# Patient Record
Sex: MALE | Race: WHITE | NOT HISPANIC OR LATINO | Employment: OTHER | URBAN - NONMETROPOLITAN AREA
[De-identification: names, ages, dates, MRNs, and addresses within clinical notes are randomized per-mention and may not be internally consistent; named-entity substitution may affect disease eponyms.]

---

## 2017-01-18 ENCOUNTER — TRANSCRIBE ORDERS (OUTPATIENT)
Dept: PODIATRY | Facility: CLINIC | Age: 77
End: 2017-01-18

## 2017-01-18 DIAGNOSIS — B35.1 ONYCHOMYCOSIS: Primary | ICD-10-CM

## 2017-01-18 DIAGNOSIS — L84 CALLUS: ICD-10-CM

## 2017-03-07 ENCOUNTER — APPOINTMENT (OUTPATIENT)
Dept: LAB | Facility: HOSPITAL | Age: 77
End: 2017-03-07

## 2017-03-07 ENCOUNTER — OFFICE VISIT (OUTPATIENT)
Dept: CARDIAC SURGERY | Facility: CLINIC | Age: 77
End: 2017-03-07

## 2017-03-07 ENCOUNTER — TRANSCRIBE ORDERS (OUTPATIENT)
Dept: LAB | Facility: HOSPITAL | Age: 77
End: 2017-03-07

## 2017-03-07 VITALS
BODY MASS INDEX: 30.92 KG/M2 | SYSTOLIC BLOOD PRESSURE: 176 MMHG | HEART RATE: 53 BPM | OXYGEN SATURATION: 91 % | DIASTOLIC BLOOD PRESSURE: 72 MMHG | TEMPERATURE: 97.7 F | HEIGHT: 68 IN | WEIGHT: 204 LBS

## 2017-03-07 DIAGNOSIS — R09.89 BRUIT: ICD-10-CM

## 2017-03-07 DIAGNOSIS — D50.8 IRON DEFICIENCY ANEMIA SECONDARY TO INADEQUATE DIETARY IRON INTAKE: ICD-10-CM

## 2017-03-07 DIAGNOSIS — I73.9 PVD (PERIPHERAL VASCULAR DISEASE) (HCC): Primary | ICD-10-CM

## 2017-03-07 DIAGNOSIS — R09.89 BILATERAL CAROTID BRUITS: ICD-10-CM

## 2017-03-07 DIAGNOSIS — I73.9 BILATERAL CLAUDICATION OF LOWER LIMB (HCC): ICD-10-CM

## 2017-03-07 LAB
ALBUMIN SERPL-MCNC: 4.5 G/DL (ref 3.4–4.8)
ALBUMIN/GLOB SERPL: 1.6 G/DL (ref 1.1–1.8)
ALP SERPL-CCNC: 49 U/L (ref 38–126)
ALT SERPL W P-5'-P-CCNC: 25 U/L (ref 21–72)
ANION GAP SERPL CALCULATED.3IONS-SCNC: 10 MMOL/L (ref 5–15)
AST SERPL-CCNC: 16 U/L (ref 17–59)
BILIRUB SERPL-MCNC: 0.5 MG/DL (ref 0.2–1.3)
BUN BLD-MCNC: 18 MG/DL (ref 7–21)
BUN/CREAT SERPL: 20.2 (ref 7–25)
CALCIUM SPEC-SCNC: 9.7 MG/DL (ref 8.4–10.2)
CHLORIDE SERPL-SCNC: 102 MMOL/L (ref 95–110)
CO2 SERPL-SCNC: 25 MMOL/L (ref 22–31)
CREAT BLD-MCNC: 0.89 MG/DL (ref 0.7–1.3)
DEPRECATED RDW RBC AUTO: 45.7 FL (ref 35.1–43.9)
ERYTHROCYTE [DISTWIDTH] IN BLOOD BY AUTOMATED COUNT: 14.3 % (ref 11.5–14.5)
GFR SERPL CREATININE-BSD FRML MDRD: 83 ML/MIN/1.73 (ref 60–98)
GLOBULIN UR ELPH-MCNC: 2.8 GM/DL (ref 2.3–3.5)
GLUCOSE BLD-MCNC: 117 MG/DL (ref 60–100)
HCT VFR BLD AUTO: 46.7 % (ref 39–49)
HGB BLD-MCNC: 16.6 G/DL (ref 13.7–17.3)
MCH RBC QN AUTO: 31.2 PG (ref 26.5–34)
MCHC RBC AUTO-ENTMCNC: 35.5 G/DL (ref 31.5–36.3)
MCV RBC AUTO: 87.8 FL (ref 80–98)
PLATELET # BLD AUTO: 198 10*3/MM3 (ref 150–450)
PMV BLD AUTO: 10.4 FL (ref 8–12)
POTASSIUM BLD-SCNC: 4.1 MMOL/L (ref 3.5–5.1)
PROT SERPL-MCNC: 7.3 G/DL (ref 6.3–8.6)
RBC # BLD AUTO: 5.32 10*6/MM3 (ref 4.37–5.74)
SODIUM BLD-SCNC: 137 MMOL/L (ref 137–145)
WBC NRBC COR # BLD: 10.85 10*3/MM3 (ref 3.2–9.8)

## 2017-03-07 PROCEDURE — 80053 COMPREHEN METABOLIC PANEL: CPT | Performed by: THORACIC SURGERY (CARDIOTHORACIC VASCULAR SURGERY)

## 2017-03-07 PROCEDURE — 99202 OFFICE O/P NEW SF 15 MIN: CPT | Performed by: THORACIC SURGERY (CARDIOTHORACIC VASCULAR SURGERY)

## 2017-03-07 PROCEDURE — 85027 COMPLETE CBC AUTOMATED: CPT | Performed by: THORACIC SURGERY (CARDIOTHORACIC VASCULAR SURGERY)

## 2017-03-07 PROCEDURE — 36415 COLL VENOUS BLD VENIPUNCTURE: CPT | Performed by: THORACIC SURGERY (CARDIOTHORACIC VASCULAR SURGERY)

## 2017-03-07 NOTE — PROGRESS NOTES
Gregg Owusu  1940            77 y.o.      3/7/2017    Chief Complaint   Patient presents with   • Leg Pain    77-year-old man seen predominantly for bilateral lower extremity pain in the calves with walking 200-300 feet.,        HPI  Duration 2-3 years, distance 200-300 feet, location calves both legs, no history of stroke or M.I.,   Risk factors, 38-45 pack years smoking,HTN, Diabetes, Hyperlipidemia.       ROS arthritis, asthma, back problems, circulation problems, diabetes, hypertension, severe muscle weakness wheezing arthritis diarrhea back pain and leg pain and though he was prescribed Lortab 10 mg 2 times daily this gives him some degree of at times constipation and feels that the pain medicine is no longer helping him    FH diabetes, hypertension, multiple cancers, thyroid disease    SH   Patient  is , wife accompanied him to this interview They have 5 children ages 56, 54, 53, 51, 49      Current Outpatient Prescriptions:   •  gabapentin (NEURONTIN) 300 MG capsule, Take 300 mg by mouth 3 (Three) Times a Day., Disp: , Rfl:   •  glipiZIDE (GLUCOTROL) 10 MG tablet, Take 10 mg by mouth 2 (Two) Times a Day Before Meals., Disp: , Rfl:   •  hydrALAZINE (APRESOLINE) 25 MG tablet, Take 25 mg by mouth 3 (Three) Times a Day., Disp: , Rfl:   •  HYDROcodone-acetaminophen (NORCO)  MG per tablet, Take 1 tablet by mouth 4 (Four) Times a Day As Needed for moderate pain (4-6) (1 tablet(s) by mouth 4 times daily for pain.(not to exceed 4 tablets daily, script must last 30 days).)., Disp: , Rfl:   •  losartan-hydrochlorothiazide (HYZAAR) 100-25 MG per tablet, Take 1 tablet by mouth Daily., Disp: , Rfl:   •  metFORMIN (GLUCOPHAGE) 500 MG tablet, Take 1,000 mg by mouth Daily With Dinner., Disp: , Rfl:   •  metoprolol succinate XL (TOPROL-XL) 50 MG 24 hr tablet, Take 50 mg by mouth 2 (Two) Times a Day., Disp: , Rfl:   •  omeprazole (priLOSEC) 20 MG capsule, Take 20 mg by mouth Daily As Needed., Disp: ,  "Rfl:   •  verapamil PM (VERELAN PM) 360 MG 24 hr capsule, Take 360 mg by mouth Every Night., Disp: , Rfl:     The following portions of the patient's history were reviewed and updated as appropriate: allergies, current medications, past family history, past medical history, past social history, past surgical history and problem list.    Physical Exam  Visit Vitals   • /72 (BP Location: Left arm)   • Pulse 53   • Temp 97.7 °F (36.5 °C) (Oral)   • Ht 68\" (172.7 cm)   • Wt 204 lb (92.5 kg)   • SpO2 91%   • BMI 31.02 kg/m2       HEENT: Soft bruits over carotid arteries no masses    CHEST: Relatively clear to auscultation    HEART: Regular rate and rhythm    ABDOMEN: Rotund no masses    EXTREMITIES: Ankle pulses are absent laterally    VASCULAR LAB STUDIES:      [facility]     (3-7-17)         RIGHT  0.51             LEFT 0.46         WAVE FORMS                                          CF    Triphasic                  Triphasic                                                                          POP   Biphasic                    Biphasic                                                                            PT    Monophasic              Biphasic                                                                            DP    Monophasic              Biphasic   Moderately severely diminished arterial perfusion  Right lower extremity at the infrainguinal (SFA) as well as infrapopliteal. Moderately severely diminished arterial perfusion left lower extremity at the infrainguinal level.                                                    CAROTID DUPLEX SCAN (3-7-17)      RIGHT 0-49%       LEFT 50-69%                                                             VERTEBRAL FLOW                  Antegrade            Antegrade         RADIOLOGY:      PVD (peripheral vascular disease) [I73.9]    IMPRESSION:  1. Moderately severely diminished arterial perfusion both lower extremities at the infrainuginal level and right " infrapopliteal level.  2.Minimal carotid disease right and moderate carotid disease left.  3,Adequate GFR for angiography.              Assessment/Plan  Gregg was seen today for leg pain.    Diagnoses and all orders for this visit:    PVD (peripheral vascular disease)  -     Doppler Ankle Brachial Index Single Level CAR    Bruit  -     Duplex Carotid Ultrasound CAR    Bilateral claudication of lower limb    Bilateral carotid bruits                Plan   1. Plan angiography will be scheduled. And patient will return post angio for follow up.  2.Repeat carotid duplex scan 1 year.

## 2017-03-10 LAB
BH CV LOWER ARTERIAL LEFT ABI RATIO: 0.46
BH CV LOWER ARTERIAL LEFT DORSALIS PEDIS SYS MAX: 87 MMHG
BH CV LOWER ARTERIAL LEFT POST TIBIAL SYS MAX: 93 MMHG
BH CV LOWER ARTERIAL RIGHT ABI RATIO: 0.51
BH CV LOWER ARTERIAL RIGHT DORSALIS PEDIS SYS MAX: 103 MMHG
BH CV LOWER ARTERIAL RIGHT POST TIBIAL SYS MAX: 101 MMHG
BH CV XLRA MEAS LEFT DIST CCA EDV: 18 CM/SEC
BH CV XLRA MEAS LEFT DIST CCA PSV: 97 CM/SEC
BH CV XLRA MEAS LEFT DIST ICA EDV: 43 CM/SEC
BH CV XLRA MEAS LEFT DIST ICA PSV: 153 CM/SEC
BH CV XLRA MEAS LEFT ICA/CCA RATIO: 1.6
BH CV XLRA MEAS LEFT MID ICA EDV: 20 CM/SEC
BH CV XLRA MEAS LEFT MID ICA PSV: 75 CM/SEC
BH CV XLRA MEAS LEFT PROX CCA EDV: 13 CM/SEC
BH CV XLRA MEAS LEFT PROX CCA PSV: 78 CM/SEC
BH CV XLRA MEAS LEFT PROX ECA EDV: 0 CM/SEC
BH CV XLRA MEAS LEFT PROX ECA PSV: 141 CM/SEC
BH CV XLRA MEAS LEFT PROX ICA EDV: 24 CM/SEC
BH CV XLRA MEAS LEFT PROX ICA PSV: 95 CM/SEC
BH CV XLRA MEAS LEFT VERTEBRAL A EDV: 12 CM/SEC
BH CV XLRA MEAS LEFT VERTEBRAL A PSV: 64 CM/SEC
BH CV XLRA MEAS RIGHT DIST CCA EDV: 15 CM/SEC
BH CV XLRA MEAS RIGHT DIST CCA PSV: 94 CM/SEC
BH CV XLRA MEAS RIGHT DIST ICA EDV: 18 CM/SEC
BH CV XLRA MEAS RIGHT DIST ICA PSV: 76 CM/SEC
BH CV XLRA MEAS RIGHT ICA/CCA RATIO: 1
BH CV XLRA MEAS RIGHT MID ICA EDV: 24 CM/SEC
BH CV XLRA MEAS RIGHT MID ICA PSV: 98 CM/SEC
BH CV XLRA MEAS RIGHT PROX CCA EDV: 12 CM/SEC
BH CV XLRA MEAS RIGHT PROX CCA PSV: 80 CM/SEC
BH CV XLRA MEAS RIGHT PROX ECA EDV: 0 CM/SEC
BH CV XLRA MEAS RIGHT PROX ECA PSV: 103 CM/SEC
BH CV XLRA MEAS RIGHT PROX ICA EDV: 19 CM/SEC
BH CV XLRA MEAS RIGHT PROX ICA PSV: 89 CM/SEC
BH CV XLRA MEAS RIGHT VERTEBRAL A EDV: 12 CM/SEC
BH CV XLRA MEAS RIGHT VERTEBRAL A PSV: 55 CM/SEC
UPPER ARTERIAL LEFT ARM BRACHIAL SYS MAX: 202 MMHG
UPPER ARTERIAL RIGHT ARM BRACHIAL SYS MAX: 180 MMHG

## 2017-03-15 DIAGNOSIS — I73.9 PVD (PERIPHERAL VASCULAR DISEASE) (HCC): Primary | ICD-10-CM

## 2017-03-15 RX ORDER — SODIUM CHLORIDE 0.9 % (FLUSH) 0.9 %
1-10 SYRINGE (ML) INJECTION AS NEEDED
Status: CANCELLED | OUTPATIENT
Start: 2017-03-15

## 2017-03-15 RX ORDER — SODIUM CHLORIDE 9 MG/ML
100 INJECTION, SOLUTION INTRAVENOUS CONTINUOUS
Status: CANCELLED | OUTPATIENT
Start: 2017-03-15

## 2017-03-17 ENCOUNTER — HOSPITAL ENCOUNTER (OUTPATIENT)
Facility: HOSPITAL | Age: 77
Setting detail: HOSPITAL OUTPATIENT SURGERY
End: 2017-03-17
Attending: THORACIC SURGERY (CARDIOTHORACIC VASCULAR SURGERY) | Admitting: THORACIC SURGERY (CARDIOTHORACIC VASCULAR SURGERY)

## 2017-03-17 DIAGNOSIS — I73.9 PVD (PERIPHERAL VASCULAR DISEASE) (HCC): ICD-10-CM

## 2017-06-19 ENCOUNTER — TRANSCRIBE ORDERS (OUTPATIENT)
Dept: CARDIOLOGY | Facility: CLINIC | Age: 77
End: 2017-06-19

## 2017-06-19 DIAGNOSIS — R00.1 BRADYCARDIA, SINUS: Primary | ICD-10-CM

## 2017-07-25 ENCOUNTER — OFFICE VISIT (OUTPATIENT)
Dept: CARDIOLOGY | Facility: CLINIC | Age: 77
End: 2017-07-25

## 2017-07-25 VITALS
OXYGEN SATURATION: 97 % | SYSTOLIC BLOOD PRESSURE: 108 MMHG | DIASTOLIC BLOOD PRESSURE: 52 MMHG | RESPIRATION RATE: 16 BRPM | BODY MASS INDEX: 31.07 KG/M2 | HEART RATE: 47 BPM | HEIGHT: 68 IN | WEIGHT: 205 LBS

## 2017-07-25 DIAGNOSIS — Z71.6 TOBACCO ABUSE COUNSELING: ICD-10-CM

## 2017-07-25 DIAGNOSIS — R00.1 BRADYCARDIA: ICD-10-CM

## 2017-07-25 DIAGNOSIS — I10 ESSENTIAL HYPERTENSION: ICD-10-CM

## 2017-07-25 DIAGNOSIS — I73.9 PVD (PERIPHERAL VASCULAR DISEASE) (HCC): Primary | ICD-10-CM

## 2017-07-25 PROCEDURE — 99202 OFFICE O/P NEW SF 15 MIN: CPT | Performed by: INTERNAL MEDICINE

## 2017-07-25 RX ORDER — FENOFIBRATE 134 MG/1
134 CAPSULE ORAL DAILY
Refills: 6 | COMMUNITY
Start: 2017-06-09

## 2017-07-25 RX ORDER — HYDROCHLOROTHIAZIDE 25 MG/1
25 TABLET ORAL DAILY
Refills: 6 | COMMUNITY
Start: 2017-06-09

## 2017-07-25 RX ORDER — AMLODIPINE BESYLATE 10 MG/1
10 TABLET ORAL DAILY
Refills: 6 | COMMUNITY
Start: 2017-06-09

## 2017-07-25 RX ORDER — LISINOPRIL 40 MG/1
40 TABLET ORAL 2 TIMES DAILY
Refills: 6 | COMMUNITY
Start: 2017-06-09

## 2017-07-25 RX ORDER — SPIRONOLACTONE 25 MG/1
25 TABLET ORAL DAILY
Refills: 6 | COMMUNITY
Start: 2017-06-09

## 2017-07-25 NOTE — PROGRESS NOTES
Chief complaint : Abnormal Holter monitor recording    History of Present Illness very pleasant 77-year-old gentleman who comes today for cardiac evaluation.  Patient was noted to have bradycardia on his Holter monitor recording.  Patient has no symptoms of dizziness or presyncopal symptoms.  He has no chest pain or chest discomfort.  He denies shortness of breath orthopnea or PND.         Review of Systems   Constitution: Negative. Negative for decreased appetite, diaphoresis, weakness, night sweats, weight gain and weight loss.   HENT: Negative for headaches, hearing loss, nosebleeds and sore throat.    Eyes: Negative.  Negative for blurred vision and photophobia.   Cardiovascular: Negative for chest pain, claudication, dyspnea on exertion, irregular heartbeat, leg swelling, palpitations, paroxysmal nocturnal dyspnea and syncope.   Respiratory: Negative for cough, hemoptysis, shortness of breath and wheezing.    Endocrine: Negative for cold intolerance, heat intolerance, polydipsia, polyphagia and polyuria.   Hematologic/Lymphatic: Negative.    Skin: Negative for color change, dry skin, flushing, itching and rash.   Musculoskeletal: Negative.  Negative for muscle cramps, muscle weakness and myalgias.   Gastrointestinal: Negative for abdominal pain, change in bowel habit, diarrhea, hematemesis, melena, nausea and vomiting.   Genitourinary: Negative for dysuria, frequency and hematuria.   Neurological: Negative for dizziness, focal weakness, light-headedness, loss of balance, numbness and seizures.   Psychiatric/Behavioral: Negative.  Negative for substance abuse, suicidal ideas and thoughts of violence.   Allergic/Immunologic: Negative.        Past Medical History:   Diagnosis Date   • Acute bronchitis    • Backache    • Blurring of visual image     possible vascular change in visual pathways     • Chronic obstructive lung disease    • Colonoscopy refused    • Coronary arteriosclerosis    • Cough    • Diabetic  peripheral neuropathy    • Disorder of peripheral nervous system    • Ectropion     lower eyelids, mild; epiphora      • Encounter for long-term current use of medication    • Essential hypertension    • Hyperlipidemia    • Hypokalemia    • Injury, lower leg     right knee injury     • Intermittent claudication    • Neoplasm of uncertain behavior of skin of face    • Nuclear cataract    • Osteoarthritis    • Screening for malignant neoplasm of prostate    • Type 2 diabetes mellitus    • Visual disturbance        Family History   Problem Relation Age of Onset   • Diabetes Mother    • Hypertension Mother    • Heart disease Father    • Hypertension Father    • Lung cancer Brother        Codeine and Tiazac [diltiazem]     reports that he has been smoking Cigarettes.  His smokeless tobacco use includes Chew. He reports that he does not drink alcohol or use illicit drugs.    Objective     Vital Signs  Heart Rate:  [47] 47  Resp:  [16] 16  BP: (108)/(52) 108/52    Physical Exam   Constitutional: He is oriented to person, place, and time. He appears well-developed and well-nourished.   HENT:   Head: Normocephalic and atraumatic.   Eyes: Conjunctivae and EOM are normal. Pupils are equal, round, and reactive to light.   Neck: Neck supple. No JVD present. Carotid bruit is not present. No tracheal deviation and no edema present.   Cardiovascular: Normal rate, regular rhythm, S1 normal, S2 normal, normal heart sounds and intact distal pulses.  Exam reveals no gallop, no S3, no S4 and no friction rub.    No murmur heard.  Pulmonary/Chest: Effort normal and breath sounds normal. He has no wheezes. He has no rales. He exhibits no tenderness.   Abdominal: Bowel sounds are normal. He exhibits no abdominal bruit and no pulsatile midline mass. There is no rebound and no guarding.   Musculoskeletal: Normal range of motion. He exhibits no edema.   Neurological: He is alert and oriented to person, place, and time.   Skin: Skin is warm and  dry.   Psychiatric: He has a normal mood and affect.       Procedures    Assessment/Plan     Patient Active Problem List   Diagnosis   • Bilateral claudication of lower limb   • Bilateral carotid bruits   • Bruit   • PVD (peripheral vascular disease)     1. PVD (peripheral vascular disease)  ELSA:  March 7, 2017   RIGHT    0.51             LEFT 0.46  Patient has no complaints of claudication however he does have bilateral hip pain.  I have counseled patient regarding tobacco smoking cessation  I have counseled patient regarding regular exercise program which includes walking.  We will continue with current guideline direct medical therapy    2. Essential hypertension  Patient's blood pressure is well-controlled with current medications.  I'm cutting back his Toprol-XL to 50 mg once a day due to bradycardia    3. Tobacco abuse counseling  Patient has been counseled regarding tobacco smoking cessation.  Patient has agreed to consider quitting smoking.    4. Bradycardia  Patient currently is asymptomatic from his bradycardia.  We'll cut his metoprolol XL to 50 mg once a day.    I discussed the patients findings and my recommendations with patient.        This document has been electronically signed by Davy Cantor MD on July 25, 2017 11:38 AM     Davy Cantor MD  07/25/17  11:35 AM